# Patient Record
Sex: FEMALE | Race: BLACK OR AFRICAN AMERICAN | NOT HISPANIC OR LATINO | Employment: FULL TIME | URBAN - METROPOLITAN AREA
[De-identification: names, ages, dates, MRNs, and addresses within clinical notes are randomized per-mention and may not be internally consistent; named-entity substitution may affect disease eponyms.]

---

## 2023-04-22 ENCOUNTER — HOSPITAL ENCOUNTER (EMERGENCY)
Facility: HOSPITAL | Age: 33
Discharge: HOME | End: 2023-04-22
Attending: EMERGENCY MEDICINE
Payer: COMMERCIAL

## 2023-04-22 VITALS
HEIGHT: 70 IN | OXYGEN SATURATION: 97 % | RESPIRATION RATE: 20 BRPM | WEIGHT: 220 LBS | DIASTOLIC BLOOD PRESSURE: 73 MMHG | BODY MASS INDEX: 31.5 KG/M2 | HEART RATE: 92 BPM | SYSTOLIC BLOOD PRESSURE: 110 MMHG | TEMPERATURE: 98 F

## 2023-04-22 DIAGNOSIS — S50.02XA CONTUSION OF LEFT ELBOW, INITIAL ENCOUNTER: Primary | ICD-10-CM
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
## 2023-04-22 PROCEDURE — 99284 EMERGENCY DEPT VISIT MOD MDM: CPT

## 2023-04-22 RX ORDER — IBUPROFEN 800 MG/1
800 TABLET ORAL EVERY 8 HOURS PRN
Qty: 20 TABLET | Refills: 0 | Status: SHIPPED | OUTPATIENT
Start: 2023-04-22

## 2023-04-22 RX ORDER — HYDROCODONE BITARTRATE AND ACETAMINOPHEN 5; 325 MG/1; MG/1
1 TABLET ORAL EVERY 6 HOURS PRN
Qty: 12 TABLET | Refills: 0 | Status: SHIPPED | OUTPATIENT
Start: 2023-04-22

## 2023-04-22 NOTE — Clinical Note
"Cassie"Todd Ramos was seen and treated in our emergency department on 4/22/2023.  She may return to work on 04/25/2023.       If you have any questions or concerns, please don't hesitate to call.      Destiny Jung MD"

## 2023-04-23 NOTE — ED PROVIDER NOTES
Encounter Date: 2023       History     Chief Complaint   Patient presents with    Arm Pain     Pt to er with left arm pain. Pt was playing football     32-year-old female was playing football and fell on her left elbow and complains of left elbow pain.  She was placed in a splint prior to arrival but states that she is not having any pain now that she is in the splint and did not want any pain medication.  She denies any other injuries.  She denies weakness or numbness to her hand.      Review of patient's allergies indicates:  No Known Allergies  Past Medical History:   Diagnosis Date    Anxiety disorder, unspecified     Essential (primary) hypertension      Past Surgical History:   Procedure Laterality Date     SECTION      x2     History reviewed. No pertinent family history.  Social History     Tobacco Use    Smoking status: Never    Smokeless tobacco: Never   Substance Use Topics    Alcohol use: Not Currently    Drug use: Never     Review of Systems   Musculoskeletal:  Positive for arthralgias.   All other systems reviewed and are negative.    Physical Exam     Initial Vitals [23 2205]   BP Pulse Resp Temp SpO2   110/73 92 20 98.4 °F (36.9 °C) 97 %      MAP       --         Physical Exam    Nursing note and vitals reviewed.  Constitutional: She appears well-developed and well-nourished.   HENT:   Head: Atraumatic.   Eyes: EOM are normal. Pupils are equal, round, and reactive to light.   Musculoskeletal:      Comments: Left elbow is neurovascular intact with no abrasions noted.  She has mild tenderness to the olecranon in mild swelling across the radial head region.  She has full range of motion with mild burning pain to the olecranon region.  No tenderness at all to mid forearm, wrist, hand, and upper arm.     Neurological: She is alert and oriented to person, place, and time. GCS score is 15. GCS eye subscore is 4. GCS verbal subscore is 5. GCS motor subscore is 6.   Skin: Capillary refill  takes less than 2 seconds.       ED Course   Procedures  Labs Reviewed - No data to display       Imaging Results              X-Ray Elbow Complete Left (In process)                   X-Rays:   Independently Interpreted Readings:   Other Readings:  Preliminary reading of the left elbow x-ray is negative  Medications - No data to display  Medical Decision Making:   Initial Assessment:   32-year-old female was playing football and fell on her left elbow and complains of left elbow pain.  She was placed in a splint prior to arrival but states that she is not having any pain now that she is in the splint and did not want any pain medication.  She denies any other injuries.  She denies weakness or numbness to her hand.      Differential Diagnosis:   Differential diagnosis includes but is not limited to contusion, sprain, fracture, abrasion  Clinical Tests:   Radiological Study: Reviewed  ED Management:  Patient was seen and evaluated in the emergency room with history, physical exam, x-ray.  No sign of fracture on my preliminary reading of the x-ray.  We will put her in a sling and I will give her prescription for medication for pain.  Pros and cons of pain medicine was discussed.  I also discussed the fact that my x-ray reading is preliminary and the radiologist will finalize the interpretation tomorrow.  She will be contacted if a fracture is seen that I did not see today.                        Clinical Impression:   Final diagnoses:  [W19.XXXA] Fall  [S50.02XA] Contusion of left elbow, initial encounter (Primary)        ED Disposition Condition    Discharge Stable          ED Prescriptions       Medication Sig Dispense Start Date End Date Auth. Provider    HYDROcodone-acetaminophen (NORCO) 5-325 mg per tablet Take 1 tablet by mouth every 6 (six) hours as needed for Pain. 12 tablet 4/22/2023 -- Destiny Jung MD    ibuprofen (ADVIL,MOTRIN) 800 MG tablet Take 1 tablet (800 mg total) by mouth every 8 (eight) hours  as needed for Pain. 20 tablet 4/22/2023 -- Destiny Jung MD          Follow-up Information    None          Destiny Jung MD  04/22/23 [id number]

## 2023-04-23 NOTE — DISCHARGE INSTRUCTIONS
Today's x-ray was read on a preliminary basis in the radiologist will finalize the interpretation tomorrow.  You will be contacted if it shows a fracture that was not seen today.  Use the sling for comfort as needed.  See your doctor next week for repeat evaluation and possible repeat x-ray next week if you continue to have pain.  X-rays will only show bony injury and do not show ligamentous injury so you may need further evaluation with imaging such as an MRI if you continue to have pain.